# Patient Record
Sex: FEMALE | Race: WHITE | URBAN - METROPOLITAN AREA
[De-identification: names, ages, dates, MRNs, and addresses within clinical notes are randomized per-mention and may not be internally consistent; named-entity substitution may affect disease eponyms.]

---

## 2020-01-14 ENCOUNTER — HOSPITAL ENCOUNTER (OUTPATIENT)
Age: 70
Discharge: HOME | End: 2020-01-16
Payer: MEDICARE

## 2020-01-14 PROCEDURE — 88304 TISSUE EXAM BY PATHOLOGIST: CPT

## 2022-11-12 ENCOUNTER — HOSPITAL ENCOUNTER (EMERGENCY)
Age: 72
Discharge: HOME | End: 2022-11-12
Payer: MEDICARE

## 2022-11-12 ENCOUNTER — APPOINTMENT (OUTPATIENT)
Dept: GENERAL RADIOLOGY | Age: 72
End: 2022-11-12
Payer: MEDICARE

## 2022-11-12 VITALS
RESPIRATION RATE: 14 BRPM | TEMPERATURE: 98.7 F | SYSTOLIC BLOOD PRESSURE: 149 MMHG | WEIGHT: 155 LBS | HEART RATE: 88 BPM | OXYGEN SATURATION: 97 % | BODY MASS INDEX: 27.46 KG/M2 | DIASTOLIC BLOOD PRESSURE: 92 MMHG

## 2022-11-12 DIAGNOSIS — J40 SINOBRONCHITIS: Primary | ICD-10-CM

## 2022-11-12 DIAGNOSIS — J32.9 SINOBRONCHITIS: Primary | ICD-10-CM

## 2022-11-12 DIAGNOSIS — J02.9 ACUTE PHARYNGITIS, UNSPECIFIED ETIOLOGY: ICD-10-CM

## 2022-11-12 LAB
INFLUENZA A BY PCR: NOT DETECTED
INFLUENZA B BY PCR: NOT DETECTED
REASON FOR REJECTION: NORMAL
REJECTED TEST: NORMAL
SARS-COV-2, NAAT: NOT DETECTED
STREP GRP A PCR: NEGATIVE

## 2022-11-12 PROCEDURE — 87635 SARS-COV-2 COVID-19 AMP PRB: CPT

## 2022-11-12 PROCEDURE — 99284 EMERGENCY DEPT VISIT MOD MDM: CPT

## 2022-11-12 PROCEDURE — 71046 X-RAY EXAM CHEST 2 VIEWS: CPT

## 2022-11-12 PROCEDURE — 87502 INFLUENZA DNA AMP PROBE: CPT

## 2022-11-12 PROCEDURE — 87880 STREP A ASSAY W/OPTIC: CPT

## 2022-11-12 RX ORDER — DEXTROMETHORPHAN HYDROBROMIDE AND PROMETHAZINE HYDROCHLORIDE 15; 6.25 MG/5ML; MG/5ML
5 SYRUP ORAL 3 TIMES DAILY PRN
Qty: 500 ML | Refills: 0 | Status: SHIPPED | OUTPATIENT
Start: 2022-11-12

## 2022-11-12 RX ORDER — CEFDINIR 300 MG/1
300 CAPSULE ORAL 2 TIMES DAILY
Qty: 14 CAPSULE | Refills: 0 | Status: SHIPPED | OUTPATIENT
Start: 2022-11-12 | End: 2022-11-19

## 2022-11-12 NOTE — ED PROVIDER NOTES
[facility]     Department of Emergency Medicine   ED  Provider Note  Admit Date/RoomTime: 11/12/2022  3:26 PM  ED Room: 29/29    Chief Complaint:   Pharyngitis (Sore throat, cough onset yesterday. Mild SOB) and Cough    History of Present Illness      Kendall Easley is a 71 y.o. old female who presents to the ED for sore throat, cough, and some congestion that began yesterday. Patient states she has pain with swallowing and talking but is still able to handle her secretions. She denies any body aches, fever/chills, chest pain, pain with breathing, abdominal pain, nausea, vomiting, or recent travel. She states she has mild shortness of breath due to her congestion. She denies any shortness of breath at rest.  Patient has no limb pain or swelling, rash, headache, dizziness, recent trauma/injury. She denies any known sick contacts. Patient is alert and oriented x3 and in no apparent distress at this exam.  She is nontoxic-appearing. She has not tried any over-the-counter medication. ROS   Pertinent positives and negatives are stated within HPI, all other systems reviewed and are negative. Past Medical History:  has a past medical history of Anemia and Osteoporosis. Past Surgical History:  has a past surgical history that includes Breast biopsy (1979); Eye surgery; and back surgery. Social History:  reports that she has never smoked. She has never used smokeless tobacco. She reports that she does not drink alcohol and does not use drugs. Family History: family history includes Cancer in her father; Hypertension in her brother, father, and mother. The patients home medications have been reviewed. Allergies: Patient has no known allergies. Allergies have been reviewed with patient.      Physical Exam   VS:  BP (!) 149/92   Pulse 88   Temp 98.7 °F (37.1 °C) (Temporal)   Resp 14   Wt 155 lb (70.3 kg)   SpO2 97%   BMI 27.46 kg/m²      Oxygen Saturation Interpretation: Normal.    Constitutional:  Alert and oriented x3, development consistent with age. NAD, mild sinus tenderness   Eyes: EOMI, non-injected conjunctiva   Ears:  External Ears: Bilateral normal.              TM's & External Canals:  normal TM's and external ear canals both ears. Throat: mild erythema and PND, uvula midline, Airway patent     Neck/Lymphatic: Supple. There is no cervical node tenderness. Non-tender with no meningeal signs   Respiratory:  Clear to auscultation and breath sounds equal, good air flow. No respiratory distress, unlabored breathing   CV: Regular rate and rhythm, 98% room air   Abdomen: Soft, non-tender, non-distended  Integument:  No rashes or erythema present. Neurological:  Motor functions intact. Lab / Imaging Results   (All laboratory and radiology results have been personally reviewed by myself)  Labs:  Results for orders placed or performed during the hospital encounter of 11/12/22   RAPID INFLUENZA A/B ANTIGENS    Specimen: Nasopharyngeal   Result Value Ref Range    Influenza A by PCR Not Detected Not Detected    Influenza B by PCR Not Detected Not Detected   COVID-19, Rapid    Specimen: Nasopharyngeal Swab   Result Value Ref Range    SARS-CoV-2, NAAT Not Detected Not Detected   Strep Screen Group A Throat    Specimen: Throat   Result Value Ref Range    Strep Grp A PCR Negative Negative   SPECIMEN REJECTION   Result Value Ref Range    Rejected Test RSS     Reason for Rejection see below      Imaging: All Radiology results interpreted by Radiologist unless otherwise noted. XR CHEST (2 VW)   Final Result   No acute process. ED Course / Medical Decision Making   ED Medications:   Medications - No data to display    Consults:  None    Procedures:  None     Medical Decision Making:   Patient is well appearing, non toxic and appropriate for outpatient management. Plan is for symptom management and PCP follow up. Counseling:     The emergency provider has spoken with the patient and/or caregiver and discussed todays results, in addition to providing specific details for the plan of care and counseling regarding the diagnosis and prognosis. Questions are answered at this time and they are agreeable with the plan. All results reviewed with pt and all questions answered. I discussed the differential, results and discharge plan with the patient and/or family/friend/caregiver if present. I emphasized the importance of follow-up with the physician I referred them to in the timeframe recommended. I explained reasons for the patient to return to the Emergency Department. Additional verbal discharge instructions were also given and discussed with the patient to supplement those generated by the EMR. We also discussed medications that were prescribed (if any) including common side effects and interactions. All questions were addressed. They understand return precautions and discharge instructions. The patient and/or family/friend/caregiver expressed understanding. Vitals were stable and they were in no distress at discharge. Antibiotics are not indicated at this time based on clinical presentation and physical findings. Not hypoxic, nothing to suggest pneumonia. Patient is well appearing, non toxic and appropriate for outpatient management. Plan of Care: Normal progression of disease discussed. All questions answered. Instruction provided in the use of fluids, vaporizer, acetaminophen, and other OTC medication for symptom control. Extra fluids  Analgesics as needed, dose reviewed. Follow up as needed should symptoms fail to improve. Shared decision making made with patient. She opted to be started on antibiotic. Patient advised to take probiotics while on antibiotic     Assessment     1. Sinobronchitis    2.  Acute pharyngitis, unspecified etiology      Plan   Discharge to home  Patient condition is good    New Medications     New Prescriptions    CEFDINIR (OMNICEF) 300 MG CAPSULE    Take 1 capsule by mouth 2 times daily for 7 days    PROMETHAZINE-DEXTROMETHORPHAN (PROMETHAZINE-DM) 6.25-15 MG/5ML SYRUP    Take 5 mLs by mouth 3 times daily as needed for Cough     Electronically signed by Pineda Mcneill PA-C   DD: 11/12/22  **This report was transcribed using voice recognition software. Every effort was made to ensure accuracy; however, inadvertent computerized transcription errors may be present.     END OF ED PROVIDER NOTE         Pineda Mcneill PA-C  11/12/22 [address], [place]  11/12/22 [id number]

## 2022-12-15 ENCOUNTER — HOSPITAL ENCOUNTER (OUTPATIENT)
Age: 72
Discharge: HOME | End: 2022-12-17